# Patient Record
Sex: FEMALE | Race: WHITE | HISPANIC OR LATINO | Employment: FULL TIME | URBAN - METROPOLITAN AREA
[De-identification: names, ages, dates, MRNs, and addresses within clinical notes are randomized per-mention and may not be internally consistent; named-entity substitution may affect disease eponyms.]

---

## 2022-05-01 ENCOUNTER — TRANSFERRED RECORDS (OUTPATIENT)
Dept: MULTI SPECIALTY CLINIC | Facility: CLINIC | Age: 27
End: 2022-05-01

## 2022-05-01 LAB — PAP SMEAR - HIM PATIENT REPORTED: NORMAL

## 2023-10-22 ENCOUNTER — HEALTH MAINTENANCE LETTER (OUTPATIENT)
Age: 28
End: 2023-10-22

## 2023-10-31 ENCOUNTER — OFFICE VISIT (OUTPATIENT)
Dept: URGENT CARE | Facility: URGENT CARE | Age: 28
End: 2023-10-31
Payer: COMMERCIAL

## 2023-10-31 VITALS
HEIGHT: 68 IN | OXYGEN SATURATION: 95 % | BODY MASS INDEX: 43.95 KG/M2 | TEMPERATURE: 98.2 F | SYSTOLIC BLOOD PRESSURE: 148 MMHG | WEIGHT: 290 LBS | HEART RATE: 110 BPM | DIASTOLIC BLOOD PRESSURE: 84 MMHG

## 2023-10-31 DIAGNOSIS — R05.3 PERSISTENT COUGH: ICD-10-CM

## 2023-10-31 DIAGNOSIS — J45.909 MODERATE ASTHMA WITHOUT COMPLICATION, UNSPECIFIED WHETHER PERSISTENT: Primary | ICD-10-CM

## 2023-10-31 PROCEDURE — 99204 OFFICE O/P NEW MOD 45 MIN: CPT | Performed by: FAMILY MEDICINE

## 2023-10-31 RX ORDER — BENZONATATE 100 MG/1
100 CAPSULE ORAL 3 TIMES DAILY PRN
Qty: 30 CAPSULE | Refills: 0 | Status: SHIPPED | OUTPATIENT
Start: 2023-10-31 | End: 2024-02-06

## 2023-10-31 RX ORDER — DOXYCYCLINE 100 MG/1
100 CAPSULE ORAL 2 TIMES DAILY
Qty: 14 CAPSULE | Refills: 0 | Status: SHIPPED | OUTPATIENT
Start: 2023-11-04 | End: 2023-11-11

## 2023-10-31 RX ORDER — PREDNISONE 20 MG/1
20 TABLET ORAL 2 TIMES DAILY
Qty: 10 TABLET | Refills: 0 | Status: SHIPPED | OUTPATIENT
Start: 2023-10-31 | End: 2023-11-05

## 2023-10-31 RX ORDER — ALBUTEROL SULFATE 90 UG/1
2 AEROSOL, METERED RESPIRATORY (INHALATION) EVERY 6 HOURS PRN
COMMUNITY
End: 2023-11-08

## 2023-10-31 NOTE — PATIENT INSTRUCTIONS
Advised patient to continuing the albuterol inhaler as needed for wheezing.  We will treat with a prednisone course to help with the chest tightness symptoms.  Continue on the Mucinex and also Tessalon Perles if symptoms still do not get better and the cough continues to be productive in spite of doing above treatment then can consider starting the antibiotic.

## 2023-10-31 NOTE — PROGRESS NOTES
Chief Complaint   Patient presents with    Urgent Care    Cough     X2 weeks of cough, wheezing,   Negative at home covid tests       Makenzie was seen today for urgent care and cough.    Diagnoses and all orders for this visit:    Moderate asthma without complication, unspecified whether persistent    Persistent cough        D/d  Acute Bronchitis  Acute Sinusitis  Asthma exacerbation  Broncospasm  Pneumonia  Post-Nasal Drip  RSV  Upper Respiratory Infection    PLAN:  See orders in Epic  Symptomatic measures encouraged, humidified air, plenty of fluids.  Advised patient to continuing the albuterol inhaler as needed for wheezing.  We will treat with a prednisone course to help with the chest tightness symptoms.  Continue on the Mucinex and also Tessalon Perles if symptoms still do not get better and the cough continues to be productive in spite of doing above treatment then can consider starting the antibiotic.  She was given a positive prescription for an antibiotic only to use it if symptoms do not get better after 5 days  SUBJECTIVE:  Makenzie Knight is a 27 year old female with history of asthma who presents to the clinic today with a chief complaint of cough , shortness of breath., and wheezing.  Said there for 2 weeks but then for last 4 to 5 days symptoms are pronounced with cough which is productive but of clear phlegm.  She denies any fever chills or any chest pain her cough is described as productive of yellow sputum.    The patient's symptoms are moderate and stable.  Associated symptoms include wheezing. The patient's symptoms are exacerbated by no particular triggers  Patient has been using inhaler  to improve symptoms.    No past medical history on file.    Current Outpatient Medications   Medication Sig Dispense Refill    albuterol (PROAIR HFA/PROVENTIL HFA/VENTOLIN HFA) 108 (90 Base) MCG/ACT inhaler Inhale 2 puffs into the lungs every 6 hours as needed for shortness of breath, wheezing or cough    "      Social History     Tobacco Use    Smoking status: Never    Smokeless tobacco: Never   Substance Use Topics    Alcohol use: Not on file       ROS  Review of systems negative except as stated above.    OBJECTIVE:  BP (!) 148/84   Pulse 110   Temp 98.2  F (36.8  C) (Temporal)   Ht 1.727 m (5' 8\")   Wt 131.5 kg (290 lb)   SpO2 95%   BMI 44.09 kg/m    GENERAL APPEARANCE: healthy, alert and no distress  EYES: EOMI,  PERRL, conjunctiva clear  HENT: ear canals and TM's normal.  Nose and mouth without ulcers, erythema or lesions  NECK: supple, nontender, no lymphadenopathy  RESP: lungs clear to auscultation - no rales, rhonchi or scattered wheezes  CV: regular rates and rhythm, normal S1 S2, no murmur noted  PSYCH: mentation appears normal    Abbie Merchant MD     "

## 2023-11-08 ENCOUNTER — OFFICE VISIT (OUTPATIENT)
Dept: FAMILY MEDICINE | Facility: CLINIC | Age: 28
End: 2023-11-08
Payer: COMMERCIAL

## 2023-11-08 VITALS
HEART RATE: 97 BPM | HEIGHT: 67 IN | TEMPERATURE: 98.4 F | SYSTOLIC BLOOD PRESSURE: 136 MMHG | RESPIRATION RATE: 18 BRPM | BODY MASS INDEX: 45.99 KG/M2 | DIASTOLIC BLOOD PRESSURE: 78 MMHG | OXYGEN SATURATION: 98 % | WEIGHT: 293 LBS

## 2023-11-08 DIAGNOSIS — N91.2 AMENORRHEA: ICD-10-CM

## 2023-11-08 DIAGNOSIS — J45.40 MODERATE PERSISTENT ASTHMA WITHOUT COMPLICATION: Primary | ICD-10-CM

## 2023-11-08 PROCEDURE — 99214 OFFICE O/P EST MOD 30 MIN: CPT

## 2023-11-08 RX ORDER — ALBUTEROL SULFATE 90 UG/1
2 AEROSOL, METERED RESPIRATORY (INHALATION) EVERY 6 HOURS PRN
Qty: 18 G | Refills: 3 | Status: SHIPPED | OUTPATIENT
Start: 2023-11-08

## 2023-11-08 ASSESSMENT — ENCOUNTER SYMPTOMS
SORE THROAT: 0
NAUSEA: 0
HEMATURIA: 0
HEADACHES: 0
EYE PAIN: 0
DYSURIA: 0
DIZZINESS: 0
PARESTHESIAS: 0
PALPITATIONS: 0
WEAKNESS: 0
ABDOMINAL PAIN: 0
HEARTBURN: 0
FEVER: 0
JOINT SWELLING: 0
ARTHRALGIAS: 0
SHORTNESS OF BREATH: 0
BREAST MASS: 0
FREQUENCY: 0
HEMATOCHEZIA: 0
CONSTIPATION: 0
DIARRHEA: 0
COUGH: 1
NERVOUS/ANXIOUS: 0
MYALGIAS: 0

## 2023-11-08 ASSESSMENT — ASTHMA QUESTIONNAIRES: ACT_TOTALSCORE: 12

## 2023-11-08 NOTE — PROGRESS NOTES
"   SUBJECTIVE:   CC: Makenzie is an 27 year old who presents for preventive health visit.       11/8/2023    10:21 AM   Additional Questions   Roomed by LEO       Patient is currently taking antibiotics for coughing. Reports it is just rattling in the evening. Coughing flaring so clear. Currently on doxycycline. Prednisone (5 day course) was somewhat helpful. Albuterol is helpful for symptoms, but not really for the rattle. Rattle sound on the exhale.  2 years ago, she had an allergic reaction.  Symptoms started the week before Halloween on 10/22.    Previously on [employer], and needed to switch insurance.    On wait list for Weight Management- interested in establishing.    Irregular periods since she has been a teenager.  11 months since having a period.        Review of Systems   Constitutional:  Negative for fever.   HENT:  Positive for congestion. Negative for ear pain, hearing loss and sore throat.    Eyes:  Negative for pain and visual disturbance.   Respiratory:  Positive for cough. Negative for shortness of breath.    Cardiovascular:  Negative for chest pain, palpitations and peripheral edema.   Gastrointestinal:  Negative for abdominal pain, constipation, diarrhea, heartburn, hematochezia and nausea.   Breasts:  Negative for tenderness, breast mass and discharge.   Genitourinary:  Negative for dysuria, frequency, genital sores, hematuria, pelvic pain, urgency, vaginal bleeding and vaginal discharge.   Musculoskeletal:  Negative for arthralgias, joint swelling and myalgias.   Skin:  Negative for rash.   Neurological:  Negative for dizziness, weakness, headaches and paresthesias.   Psychiatric/Behavioral:  Negative for mood changes. The patient is not nervous/anxious.         OBJECTIVE:   /78 (BP Location: Right arm, Patient Position: Sitting, Cuff Size: Adult Large)   Pulse 97   Temp 98.4  F (36.9  C) (Temporal)   Resp 18   Ht 1.704 m (5' 7.09\")   Wt 133.8 kg (295 lb)   LMP  (LMP Unknown)   SpO2 " 98%   BMI 46.08 kg/m    Physical Exam  GENERAL: healthy, alert and no distress  RESP: lungs clear to auscultation - no rales, rhonchi or wheezes  PSYCH: mentation appears normal, affect normal/bright    ASSESSMENT/PLAN:   (J45.40) Moderate persistent asthma without complication  (primary encounter diagnosis)  Chronic, presently uncontrolled  Plan: mometasone-formoterol (DULERA) 100-5 MCG/ACT         inhaler, albuterol (PROAIR HFA/PROVENTIL         HFA/VENTOLIN HFA) 108 (90 Base) MCG/ACT inhaler  Patient's ACT score is currently at 12, although I think she may have some underlying bronchitis that there is contributing to her symptoms.  Bronchitis fits with cough slowly improving to transition to just at night.  We could have her try an inhaled LABA ICS as needed for asthma symptom flareups per Mickie guidelines.  We did discuss the importance of rinsing out the mouth after Dulera use.  No wheezing noted on exam today, making asthma exacerbation unlikely.  However, her asthma does typically flare in the cold months, so we could try stepping up therapy at this time    (N91.2) Amenorrhea  Plan: Ob/Gyn  Referral  Patient reports having issues with amenorrhea since she was a teenager.  She is interested in having a child at some point in the future.  She would be interested in seeing OB related to this issue.    (Z68.42) Body mass index (BMI) of 45.0-49.9 in adult (H)  Plan: Adult Comprehensive Weight Management         Referral            Daniel Morrison NP  [facility]

## 2023-11-20 ENCOUNTER — OFFICE VISIT (OUTPATIENT)
Dept: MIDWIFE SERVICES | Facility: CLINIC | Age: 28
End: 2023-11-20
Payer: COMMERCIAL

## 2023-11-20 VITALS
DIASTOLIC BLOOD PRESSURE: 95 MMHG | TEMPERATURE: 99.3 F | WEIGHT: 293 LBS | HEART RATE: 112 BPM | HEIGHT: 68 IN | SYSTOLIC BLOOD PRESSURE: 146 MMHG | BODY MASS INDEX: 44.41 KG/M2

## 2023-11-20 DIAGNOSIS — N91.2 AMENORRHEA: Primary | ICD-10-CM

## 2023-11-20 LAB
FSH SERPL IRP2-ACNC: 4.1 MIU/ML
PROLACTIN SERPL 3RD IS-MCNC: 16 NG/ML (ref 5–23)
TSH SERPL DL<=0.005 MIU/L-ACNC: 2.52 UIU/ML (ref 0.3–4.2)

## 2023-11-20 PROCEDURE — 84146 ASSAY OF PROLACTIN: CPT | Performed by: ADVANCED PRACTICE MIDWIFE

## 2023-11-20 PROCEDURE — 83520 IMMUNOASSAY QUANT NOS NONAB: CPT | Performed by: ADVANCED PRACTICE MIDWIFE

## 2023-11-20 PROCEDURE — 84443 ASSAY THYROID STIM HORMONE: CPT | Performed by: ADVANCED PRACTICE MIDWIFE

## 2023-11-20 PROCEDURE — 36415 COLL VENOUS BLD VENIPUNCTURE: CPT | Performed by: ADVANCED PRACTICE MIDWIFE

## 2023-11-20 PROCEDURE — 99204 OFFICE O/P NEW MOD 45 MIN: CPT | Performed by: ADVANCED PRACTICE MIDWIFE

## 2023-11-20 PROCEDURE — 83001 ASSAY OF GONADOTROPIN (FSH): CPT | Performed by: ADVANCED PRACTICE MIDWIFE

## 2023-11-20 RX ORDER — MEDROXYPROGESTERONE ACETATE 10 MG
10 TABLET ORAL DAILY
Qty: 10 TABLET | Refills: 0 | Status: SHIPPED | OUTPATIENT
Start: 2023-11-20 | End: 2024-02-06

## 2023-11-20 NOTE — PROGRESS NOTES
"S: Patient presents to discuss irregular periods. Has not had a period now for 11 months. This is the longest it has ever been although she has gone 9-10 months before this. Remembers periods becoming irregular when she moved from [place] to [place] for college and gained weight. Is not on any hormonal medication and never has been, has never been pregnant. Denies any procedures on uterus. [facility] 2022 at [facility]. Does not have immediate desire to get pregnant but would like to in the future and is worried that this may be premature ovarian failure. Has an appointment in February for weight loss management. Of note, three sisters all had difficulty achieving pregnancy. One is going through IVF currently.     O:   BP (!) 146/95   Pulse 112   Temp 99.3  F (37.4  C)   Ht 1.715 m (5' 7.5\")   Wt 137.1 kg (302 lb 3.2 oz)   LMP 09/15/2022 (Exact Date)   BMI 46.63 kg/m      General: well appearing, in NAD  Cardiac: well perfused  Respiratory: non-labored breathing on room air   Psych: alert and oriented     A/P:   (N91.2) Amenorrhea  (primary encounter diagnosis)    Plan: TSH with free T4 reflex, Prolactin, Follicle         stimulating hormone, Anti-Mullerian hormone, US        Transvaginal Pelvic Non-OB, medroxyPROGESTERone        (PROVERA) 10 MG tablet          Discussed potential causes of amenorrhea including pregnancy (not possible), Ashermans (no history of uterine procedure), premature menopause, hypothalamic dysfunction, endocrine disorder, PCOS, and genetic factors.    If TSH and prolactin normal and she has a withdrawal bleed after a progesterone challenge then presume amenorrhea related to anovulatory cycles.     Also discussed that treatment depends specifically on goals regarding conception and if she desires to have monthly cycles or not. Plan to notify patient of lab results via [employer]. She will reach out with progesterone challenge results. Once this and pelvic ultrasound complete can review " next steps.     RTC for pelvic ultrasound as scheduled  Gabriela Ricks CNM

## 2023-11-21 LAB — MIS SERPL-MCNC: 2.99 NG/ML (ref 0.89–9.9)

## 2023-12-30 ENCOUNTER — NURSE TRIAGE (OUTPATIENT)
Dept: NURSING | Facility: CLINIC | Age: 28
End: 2023-12-30

## 2023-12-31 NOTE — TELEPHONE ENCOUNTER
Nurse Triage SBAR    Is this a 2nd Level Triage? NO    Situation: Suspected Influenza    Background: :Patient has asthma.     Assessment: Patient reports caring for her father with influenza and is experiencing slight cough, tickle in throat, and swollen gland. Patient does not have a PCP with [facility]. Patient reports that she is currently in [place].    Protocol Recommended Disposition:   According to the protocol, patient should discuss with provider, suggested ot contact urgent care in [place].  Care advice given. Patient verbalizes understanding and agrees with plan of care.       Influenza-Like Illness (MADY) RN Standing Order (13+)    Makenzie Knight      Age: 28 year old     YOB: 1995    Patient has been triaged using Epic triage guidelines: Patient does not need higher level of care     Has the patient been seen at a [facility] or [facility] (established in primary or specialty care) in the last two years? No, therefore the patient is not eligible for MADY Standing Order evaluation.  Recommend an in clinic visit with a provider.    Additional educational resources include:  http://www.[employer]  http://www.cdc.gov/flu/  Ariana Lopez RN     Reason for Disposition   Influenza suspected (i.e., fever and respiratory symptoms; probable influenza exposure)   Patient is HIGH RISK (e.g., age > 64 years, pregnant, HIV+, or chronic medical condition)    Additional Information   Negative: SEVERE difficulty breathing (e.g., struggling for each breath, speaks in single words)   Negative: Difficult to awaken or acting confused (e.g., disoriented, slurred speech)   Negative: Bluish (or gray) lips or face now   Negative: Shock suspected (e.g., cold/pale/clammy skin, too weak to stand, low BP, rapid pulse)   Negative: Sounds like a life-threatening emergency to the triager   Negative: [1] Symptoms of COVID-19 (e.g., cough, fever, SOB, or others) AND [2] lives in an area with community spread    Negative: [1] Sounds like a cold AND [2] no fever   Negative: [1] Cough with sputum AND [2] no fever   Negative: [1] Dry cough (no sputum) AND [2] no fever   Negative: [1] Throat pain AND [2] no fever   Negative: Influenza vaccine reaction is suspected   Negative: Chest pain  (Exception: MILD central chest pain, present only when coughing)   Negative: [1] Headache AND [2] stiff neck (can't touch chin to chest)   Negative: Fever > 104 F (40 C)   Negative: [1] Difficulty breathing AND [2] not severe AND [3] not from stuffy nose (e.g., not relieved by cleaning out the nose)   Negative: Patient sounds very sick or weak to the triager   Negative: [1] Fever > 101 F (38.3 C) AND [2] age > 60 years   Negative: [1] Fever > 100.0 F (37.8 C) AND [2] bedridden (e.g., nursing home patient, CVA, chronic illness, recovering from surgery)   Negative: [1] Fever > 100.0 F (37.8 C) AND [2] diabetes mellitus or weak immune system (e.g., HIV positive, cancer chemo, splenectomy, organ transplant, chronic steroids)    Protocols used: Influenza Exposure-A-, Influenza - Seasonal-A-

## 2024-02-05 ENCOUNTER — E-VISIT (OUTPATIENT)
Dept: URGENT CARE | Facility: CLINIC | Age: 29
End: 2024-02-05
Payer: COMMERCIAL

## 2024-02-05 DIAGNOSIS — R06.2 WHEEZING: Primary | ICD-10-CM

## 2024-02-05 PROCEDURE — 99207 PR NON-BILLABLE SERV PER CHARTING: CPT | Performed by: FAMILY MEDICINE

## 2024-02-05 NOTE — PATIENT INSTRUCTIONS
Dear Makenzie Knight,    We are sorry you are not feeling well. Based on the responses you provided, it is recommended that you be seen in-person in urgent care so we can better evaluate your symptoms. Please click here to find the nearest urgent care location to you.   You will not be charged for this Visit. Thank you for trusting us with your care.    Amelia Soler MD

## 2024-02-06 ENCOUNTER — VIRTUAL VISIT (OUTPATIENT)
Dept: FAMILY MEDICINE | Facility: CLINIC | Age: 29
End: 2024-02-06
Payer: COMMERCIAL

## 2024-02-06 DIAGNOSIS — R05.1 ACUTE COUGH: Primary | ICD-10-CM

## 2024-02-06 DIAGNOSIS — J40 BRONCHITIS: ICD-10-CM

## 2024-02-06 DIAGNOSIS — J45.41 MODERATE PERSISTENT ASTHMA WITH EXACERBATION: ICD-10-CM

## 2024-02-06 PROCEDURE — 99214 OFFICE O/P EST MOD 30 MIN: CPT | Mod: 95 | Performed by: FAMILY MEDICINE

## 2024-02-06 RX ORDER — ALBUTEROL SULFATE 90 UG/1
2 AEROSOL, METERED RESPIRATORY (INHALATION) EVERY 6 HOURS PRN
Qty: 18 G | Refills: 3 | Status: SHIPPED | OUTPATIENT
Start: 2024-02-06

## 2024-02-06 RX ORDER — FLUTICASONE PROPIONATE 220 UG/1
1 AEROSOL, METERED RESPIRATORY (INHALATION) 2 TIMES DAILY
Qty: 12 G | Refills: 1 | Status: SHIPPED | OUTPATIENT
Start: 2024-02-06

## 2024-02-06 RX ORDER — AZITHROMYCIN 250 MG/1
TABLET, FILM COATED ORAL
Qty: 6 TABLET | Refills: 0 | Status: SHIPPED | OUTPATIENT
Start: 2024-02-06 | End: 2024-02-11

## 2024-02-06 RX ORDER — BENZONATATE 100 MG/1
100 CAPSULE ORAL 3 TIMES DAILY PRN
Qty: 30 CAPSULE | Refills: 0 | Status: SHIPPED | OUTPATIENT
Start: 2024-02-06

## 2024-02-06 RX ORDER — PREDNISONE 20 MG/1
TABLET ORAL
Qty: 9 TABLET | Refills: 0 | Status: SHIPPED | OUTPATIENT
Start: 2024-02-06

## 2024-02-06 ASSESSMENT — ASTHMA QUESTIONNAIRES
QUESTION_2 LAST FOUR WEEKS HOW OFTEN HAVE YOU HAD SHORTNESS OF BREATH: ONCE OR TWICE A WEEK
ACT_TOTALSCORE: 17
QUESTION_3 LAST FOUR WEEKS HOW OFTEN DID YOUR ASTHMA SYMPTOMS (WHEEZING, COUGHING, SHORTNESS OF BREATH, CHEST TIGHTNESS OR PAIN) WAKE YOU UP AT NIGHT OR EARLIER THAN USUAL IN THE MORNING: ONCE A WEEK
QUESTION_1 LAST FOUR WEEKS HOW MUCH OF THE TIME DID YOUR ASTHMA KEEP YOU FROM GETTING AS MUCH DONE AT WORK, SCHOOL OR AT HOME: NONE OF THE TIME
ACT_TOTALSCORE: 17
QUESTION_5 LAST FOUR WEEKS HOW WOULD YOU RATE YOUR ASTHMA CONTROL: SOMEWHAT CONTROLLED
QUESTION_4 LAST FOUR WEEKS HOW OFTEN HAVE YOU USED YOUR RESCUE INHALER OR NEBULIZER MEDICATION (SUCH AS ALBUTEROL): ONE OR TWO TIMES PER DAY

## 2024-02-06 NOTE — PROGRESS NOTES
"Makenzei is a 28 year old who is being evaluated via a billable video visit.      How would you like to obtain your AVS? MyChart  If the video visit is dropped, the invitation should be resent by: Send to e-mail at: [email or web address]  Will anyone else be joining your video visit? No          Assessment & Plan     Acute cough  See below     Moderate persistent asthma with exacerbation  We discussed a round of prednisone as she is having asthma exacerbation   - albuterol (PROAIR HFA/PROVENTIL HFA/VENTOLIN HFA) 108 (90 Base) MCG/ACT inhaler; Inhale 2 puffs into the lungs every 6 hours as needed for shortness of breath, wheezing or cough  - benzonatate (TESSALON) 100 MG capsule; Take 1 capsule (100 mg) by mouth 3 times daily as needed for cough  - fluticasone (FLOVENT HFA) 220 MCG/ACT inhaler; Inhale 1 puff into the lungs 2 times daily  - predniSONE (DELTASONE) 20 MG tablet; Take two tablets daily for two days then one tablet daily for three days then half a tablet daily for four days    Bronchitis  Abx course as well  - azithromycin (ZITHROMAX) 250 MG tablet; Take 2 tablets (500 mg) by mouth daily for 1 day, THEN 1 tablet (250 mg) daily for 4 days.          BMI  Estimated body mass index is 46.63 kg/m  as calculated from the following:    Height as of 11/20/23: 1.715 m (5' 7.5\").    Weight as of 11/20/23: 137.1 kg (302 lb 3.2 oz).         RTC if no improving or worsening.  Pt is aware  and comfortable with the current plan.      Subjective   Makenzie is a 28 year old, presenting for the following health issues:  No chief complaint on file.        2/6/2024     9:13 AM   Additional Questions   Roomed by Nancy MICHELLE     HPI     Acute Illness  Acute illness concerns: Wheezing, Productive cough -- ?Bronchitis  Onset/Duration: Sunday 2/4  Symptoms:  Fever: No  Chills/Sweats: No  Headache (location?): No  Sinus Pressure: YES- mild  Conjunctivitis:  No  Ear Pain: no  Rhinorrhea: YES  Congestion: YES  Sore Throat: YES from " coughing  Cough: YES-productive of yellow and green sputum  Wheeze: YES  Decreased Appetite: YES  Nausea: No  Vomiting: No  Diarrhea: YES- mild  Dysuria/Freq.: No  Dysuria or Hematuria: No  Fatigue/Achiness: YES  Sick/Strep Exposure: No  Therapies tried and outcome: Dayquil, Inhaler helped for wheezing, Steam helps with asthma, Hot tea, Throat numbing spray        Review of Systems  Constitutional, HEENT, cardiovascular, pulmonary, GI, , musculoskeletal, neuro, skin, endocrine and psych systems are negative, except as otherwise noted.      Objective           Vitals:  No vitals were obtained today due to virtual visit.    Physical Exam   GENERAL: alert and no distress  EYES: Eyes grossly normal to inspection.  No discharge or erythema, or obvious scleral/conjunctival abnormalities.  RESP: No audible wheeze, cough, or visible cyanosis.    SKIN: Visible skin clear. No significant rash, abnormal pigmentation or lesions.  NEURO: Cranial nerves grossly intact.  Mentation and speech appropriate for age.  PSYCH: Appropriate affect, tone, and pace of words    No results found for any visits on 02/06/24.      Video-Visit Details    Type of service:  Video Visit     Originating Location (pt. Location): Home    Distant Location (provider location):  On-site  Platform used for Video Visit: Juan  Signed Electronically by: Yelitza Reyes MD

## 2024-03-26 ENCOUNTER — LAB (OUTPATIENT)
Dept: LAB | Facility: CLINIC | Age: 29
End: 2024-03-26
Payer: COMMERCIAL

## 2024-03-26 DIAGNOSIS — Z11.1 SCREENING EXAMINATION FOR PULMONARY TUBERCULOSIS: ICD-10-CM

## 2024-03-26 PROCEDURE — 36415 COLL VENOUS BLD VENIPUNCTURE: CPT

## 2024-03-26 PROCEDURE — 86481 TB AG RESPONSE T-CELL SUSP: CPT

## 2024-03-27 LAB
GAMMA INTERFERON BACKGROUND BLD IA-ACNC: 0.03 IU/ML
M TB IFN-G BLD-IMP: NEGATIVE
M TB IFN-G CD4+ BCKGRND COR BLD-ACNC: 9.97 IU/ML
MITOGEN IGNF BCKGRD COR BLD-ACNC: 0.05 IU/ML
MITOGEN IGNF BCKGRD COR BLD-ACNC: 0.11 IU/ML
QUANTIFERON MITOGEN: 10 IU/ML
QUANTIFERON NIL TUBE: 0.03 IU/ML
QUANTIFERON TB1 TUBE: 0.14 IU/ML
QUANTIFERON TB2 TUBE: 0.08

## 2024-11-24 ENCOUNTER — E-VISIT (OUTPATIENT)
Dept: URGENT CARE | Facility: CLINIC | Age: 29
End: 2024-11-24
Payer: COMMERCIAL

## 2024-11-24 DIAGNOSIS — J35.1 SWOLLEN TONSIL: Primary | ICD-10-CM

## 2024-11-24 PROCEDURE — 99207 PR NON-BILLABLE SERV PER CHARTING: CPT | Performed by: PHYSICIAN ASSISTANT

## 2024-11-25 NOTE — PATIENT INSTRUCTIONS
Dear Makenzie Knight,    We are sorry you are not feeling well. Based on the responses you provided, you may be experiencing a serious health condition that needs immediate in-person attention. It is recommended that you be seen in the emergency room in order to better evaluate your symptoms. Please click here to find the nearest Emergency Room.     Cora Harp PA-C

## 2024-12-15 ENCOUNTER — HEALTH MAINTENANCE LETTER (OUTPATIENT)
Age: 29
End: 2024-12-15